# Patient Record
Sex: FEMALE | Race: WHITE | NOT HISPANIC OR LATINO | Employment: FULL TIME | URBAN - METROPOLITAN AREA
[De-identification: names, ages, dates, MRNs, and addresses within clinical notes are randomized per-mention and may not be internally consistent; named-entity substitution may affect disease eponyms.]

---

## 2023-12-12 ENCOUNTER — OFFICE VISIT (OUTPATIENT)
Dept: OBSTETRICS AND GYNECOLOGY | Facility: CLINIC | Age: 23
End: 2023-12-12
Payer: COMMERCIAL

## 2023-12-12 VITALS — WEIGHT: 138.4 LBS | SYSTOLIC BLOOD PRESSURE: 110 MMHG | DIASTOLIC BLOOD PRESSURE: 70 MMHG | BODY MASS INDEX: 23.76 KG/M2

## 2023-12-12 DIAGNOSIS — Z01.419 ENCNTR FOR GYN EXAM (GENERAL) (ROUTINE) W/O ABN FINDINGS: Primary | ICD-10-CM

## 2023-12-12 DIAGNOSIS — Z12.4 SCREENING FOR CERVICAL CANCER: ICD-10-CM

## 2023-12-12 PROCEDURE — 87624 HPV HI-RISK TYP POOLED RSLT: CPT

## 2023-12-12 PROCEDURE — 88175 CYTOPATH C/V AUTO FLUID REDO: CPT

## 2023-12-12 PROCEDURE — 99395 PREV VISIT EST AGE 18-39: CPT | Performed by: ADVANCED PRACTICE MIDWIFE

## 2023-12-12 RX ORDER — ETONOGESTREL AND ETHINYL ESTRADIOL VAGINAL RING .015; .12 MG/D; MG/D
RING VAGINAL
COMMUNITY
Start: 2020-07-11 | End: 2023-12-12 | Stop reason: ALTCHOICE

## 2023-12-12 NOTE — PROGRESS NOTES
GYNECOLOGY PROGRESS NOTE      CC:  see below. Hx of abnormal paps since 2019. Patient here for her next pap. D/O follow up based on this pap. Either repeat 1 year or colpo. Patient not sexually active at this time. No concerns with vaginal discharge. Patient stopped OCPs about 1 year ago. Menses were regular on birth control and now she does have a menses then it stops and then it restarts for a day or so.   Chief Complaint   Patient presents with    Annual Exam     Est pt for annual.   Pap 2022 LSIL hpv +     Menstrual Problem     States periods are lasting longer. They will stop in the middle and come back. Did stop using Nuva ring.        HPI:  Lucita Holguin is here for a routine GYN examination.  No GYN c/o, no AUB.      Contraception:  none  Pregnancy plans:  none  Gardasil:  no    Depression screen:  negative      ROS:  GI - no blood in BMs  URO - no hematuria  GYN - no AUB or vaginal discharge  PSYCH - mood OK        PHYSICAL EXAM:  /70   Wt 62.8 kg (138 lb 6.4 oz)   LMP 11/20/2023   BMI 23.76 kg/m²   GEN:  A&O, NAD  ABD:  NT/ND, soft, no palpable masses  URO:  normal urethra, no bladder TTP  GYN:  normal vulva and perineum w/o lesions or ulcers, normal vagina without discharge or lesions, normal cervix without lesions or discharge or CMT, uterus NT/NE, adnexa mobile and NT/NE  DERM:  no hirsutism or acne   PSYCH:  normal affect, non-anxious      IMPRESSION/PLAN:  A: normal gyn exam. Stopped OCPS and menses monthly but she does have spotting between menses at times too.    Pap Hx:  8/2019 LGSIL  9/2020 WNL +HPV other  2021 ASCUS +HPV other  2022 LGSIL +HPV other    Plan: 1. Pap today if abnormal to scheduled colpo. If normal repeat 1 year.     Problem List Items Addressed This Visit    None  Visit Diagnoses       Screening for cervical cancer                     KRISTIAN Menchaca-RAUL

## 2024-01-05 LAB

## 2024-12-17 ENCOUNTER — APPOINTMENT (OUTPATIENT)
Dept: OBSTETRICS AND GYNECOLOGY | Facility: CLINIC | Age: 24
End: 2024-12-17
Payer: COMMERCIAL

## 2024-12-17 VITALS
SYSTOLIC BLOOD PRESSURE: 130 MMHG | HEIGHT: 64 IN | BODY MASS INDEX: 23.17 KG/M2 | WEIGHT: 135.7 LBS | DIASTOLIC BLOOD PRESSURE: 70 MMHG

## 2024-12-17 DIAGNOSIS — Z12.4 SCREENING FOR CERVICAL CANCER: ICD-10-CM

## 2024-12-17 DIAGNOSIS — N92.6 IRREGULAR MENSES: ICD-10-CM

## 2024-12-17 DIAGNOSIS — Z01.419 ENCNTR FOR GYN EXAM (GENERAL) (ROUTINE) W/O ABN FINDINGS: Primary | ICD-10-CM

## 2024-12-17 PROCEDURE — 3008F BODY MASS INDEX DOCD: CPT | Performed by: ADVANCED PRACTICE MIDWIFE

## 2024-12-17 PROCEDURE — 1036F TOBACCO NON-USER: CPT | Performed by: ADVANCED PRACTICE MIDWIFE

## 2024-12-17 PROCEDURE — 99395 PREV VISIT EST AGE 18-39: CPT | Performed by: ADVANCED PRACTICE MIDWIFE

## 2024-12-17 PROCEDURE — 87624 HPV HI-RISK TYP POOLED RSLT: CPT

## 2024-12-17 RX ORDER — ALBUTEROL SULFATE 90 UG/1
2 INHALANT RESPIRATORY (INHALATION) EVERY 6 HOURS PRN
COMMUNITY
Start: 2023-12-27

## 2024-12-17 NOTE — PROGRESS NOTES
"GYNECOLOGY PROGRESS NOTE        CC:  see below. Patient's pap in 2022 LGSIL +other, 2023 pap was wnl. Needs pap today. No concerns about STI. Same partner x 3 years now. Stopped birth control before her last visit. She has some regular menses but she is under a lot of stress at this time and she has noted her menses are irregular. Sonogram done 2022 was wnl, no cysts/follicles noted. Patient does have the c/o some dark hair on her chest at times. Patient will consider PCOS labs.  Chief Complaint   Patient presents with    Annual Exam     Est pt annual visit.   Patient is having AUB. States will have spotting for a couple days to a week prior to period.   Pap 12/2023 neg        HPI:  Lucita Holguin is here for a routine GYN examination.  No GYN c/o, no AUB.        Contraception:  condoms  Pregnancy plans:  none  Gardasil:  no      ROS:  GI - no blood in BMs  URO - no hematuria  GYN - no AUB or vaginal discharge  PSYCH - mood OK        PHYSICAL EXAM:  /70 (BP Location: Left arm, Patient Position: Sitting, BP Cuff Size: Adult)   Ht 1.626 m (5' 4\")   Wt 61.6 kg (135 lb 11.2 oz)   LMP 11/20/2024   BMI 23.29 kg/m²   GEN:  A&O, NAD  URO:  normal urethra, no bladder TTP  GYN:  normal vulva and perineum w/o lesions or ulcers, normal vagina without discharge or lesions, normal cervix without lesions or discharge or CMT, uterus NT/NE, adnexa mobile and NT/NE  BREAST:  no masses or TTP, no skin lesions or nipple discharge  DERM:  no acne   PSYCH:  normal affect, non-anxious      IMPRESSION/PLAN:    A: normal gyn exam. Irregular menses more recently.   Plan: 1. Pap today. 2. Will call if she wants PCOS labs done or Rx for menstrual regulation.  Problem List Items Addressed This Visit    None       Pap normal 2023, no Hx of HGSIL,  ASCCP pap smear screening guidelines reviewed with the patient.  Annual STD screening done per CDC guidelines if applicable (if < age 25).      KRISTIAN Mecnhaca-RAUL      "

## 2025-12-18 ENCOUNTER — APPOINTMENT (OUTPATIENT)
Dept: OBSTETRICS AND GYNECOLOGY | Facility: CLINIC | Age: 25
End: 2025-12-18
Payer: COMMERCIAL

## 2025-12-23 ENCOUNTER — APPOINTMENT (OUTPATIENT)
Dept: OBSTETRICS AND GYNECOLOGY | Facility: CLINIC | Age: 25
End: 2025-12-23
Payer: COMMERCIAL